# Patient Record
Sex: MALE | Race: WHITE | Employment: FULL TIME | URBAN - METROPOLITAN AREA
[De-identification: names, ages, dates, MRNs, and addresses within clinical notes are randomized per-mention and may not be internally consistent; named-entity substitution may affect disease eponyms.]

---

## 2019-02-22 ENCOUNTER — HOSPITAL ENCOUNTER (EMERGENCY)
Age: 49
Discharge: HOME | End: 2019-02-22
Attending: EMERGENCY MEDICINE
Payer: COMMERCIAL

## 2019-02-22 ENCOUNTER — APPOINTMENT (OUTPATIENT)
Dept: CT IMAGING | Age: 49
End: 2019-02-22
Attending: EMERGENCY MEDICINE
Payer: COMMERCIAL

## 2019-02-22 VITALS
TEMPERATURE: 98.9 F | WEIGHT: 300 LBS | BODY MASS INDEX: 45.47 KG/M2 | RESPIRATION RATE: 19 BRPM | OXYGEN SATURATION: 93 % | HEART RATE: 80 BPM | DIASTOLIC BLOOD PRESSURE: 73 MMHG | HEIGHT: 68 IN | SYSTOLIC BLOOD PRESSURE: 132 MMHG

## 2019-02-22 DIAGNOSIS — J03.90 ACUTE TONSILLITIS, UNSPECIFIED ETIOLOGY: Primary | ICD-10-CM

## 2019-02-22 LAB
ALBUMIN SERPL-MCNC: 3.8 G/DL (ref 3.5–5)
ALBUMIN/GLOB SERPL: 1 {RATIO} (ref 1.1–2.2)
ALP SERPL-CCNC: 65 U/L (ref 45–117)
ALT SERPL-CCNC: 30 U/L (ref 12–78)
ANION GAP SERPL CALC-SCNC: 9 MMOL/L (ref 5–15)
AST SERPL-CCNC: 17 U/L (ref 15–37)
BASOPHILS # BLD: 0.1 K/UL (ref 0–0.1)
BASOPHILS NFR BLD: 0 % (ref 0–1)
BILIRUB SERPL-MCNC: 0.4 MG/DL (ref 0.2–1)
BUN SERPL-MCNC: 11 MG/DL (ref 6–20)
BUN/CREAT SERPL: 11 (ref 12–20)
CALCIUM SERPL-MCNC: 9 MG/DL (ref 8.5–10.1)
CHLORIDE SERPL-SCNC: 102 MMOL/L (ref 97–108)
CO2 SERPL-SCNC: 27 MMOL/L (ref 21–32)
COMMENT, HOLDF: NORMAL
CREAT SERPL-MCNC: 1.04 MG/DL (ref 0.7–1.3)
DEPRECATED S PYO AG THROAT QL EIA: NEGATIVE
DIFFERENTIAL METHOD BLD: ABNORMAL
EOSINOPHIL # BLD: 0.2 K/UL (ref 0–0.4)
EOSINOPHIL NFR BLD: 2 % (ref 0–7)
ERYTHROCYTE [DISTWIDTH] IN BLOOD BY AUTOMATED COUNT: 15.2 % (ref 11.5–14.5)
GLOBULIN SER CALC-MCNC: 4 G/DL (ref 2–4)
GLUCOSE SERPL-MCNC: 109 MG/DL (ref 65–100)
HCT VFR BLD AUTO: 43.6 % (ref 36.6–50.3)
HGB BLD-MCNC: 13.9 G/DL (ref 12.1–17)
IMM GRANULOCYTES # BLD AUTO: 0.1 K/UL (ref 0–0.04)
IMM GRANULOCYTES NFR BLD AUTO: 1 % (ref 0–0.5)
LYMPHOCYTES # BLD: 1.3 K/UL (ref 0.8–3.5)
LYMPHOCYTES NFR BLD: 11 % (ref 12–49)
MCH RBC QN AUTO: 26.8 PG (ref 26–34)
MCHC RBC AUTO-ENTMCNC: 31.9 G/DL (ref 30–36.5)
MCV RBC AUTO: 84 FL (ref 80–99)
MONOCYTES # BLD: 1 K/UL (ref 0–1)
MONOCYTES NFR BLD: 8 % (ref 5–13)
NEUTS SEG # BLD: 9 K/UL (ref 1.8–8)
NEUTS SEG NFR BLD: 78 % (ref 32–75)
NRBC # BLD: 0 K/UL (ref 0–0.01)
NRBC BLD-RTO: 0 PER 100 WBC
PLATELET # BLD AUTO: 563 K/UL (ref 150–400)
PMV BLD AUTO: 11.2 FL (ref 8.9–12.9)
POTASSIUM SERPL-SCNC: 4.1 MMOL/L (ref 3.5–5.1)
PROT SERPL-MCNC: 7.8 G/DL (ref 6.4–8.2)
RBC # BLD AUTO: 5.19 M/UL (ref 4.1–5.7)
SAMPLES BEING HELD,HOLD: NORMAL
SODIUM SERPL-SCNC: 138 MMOL/L (ref 136–145)
WBC # BLD AUTO: 11.7 K/UL (ref 4.1–11.1)

## 2019-02-22 PROCEDURE — 87880 STREP A ASSAY W/OPTIC: CPT

## 2019-02-22 PROCEDURE — 80053 COMPREHEN METABOLIC PANEL: CPT

## 2019-02-22 PROCEDURE — 74011636320 HC RX REV CODE- 636/320: Performed by: RADIOLOGY

## 2019-02-22 PROCEDURE — 74011250636 HC RX REV CODE- 250/636: Performed by: EMERGENCY MEDICINE

## 2019-02-22 PROCEDURE — 87070 CULTURE OTHR SPECIMN AEROBIC: CPT

## 2019-02-22 PROCEDURE — 70491 CT SOFT TISSUE NECK W/DYE: CPT

## 2019-02-22 PROCEDURE — 96375 TX/PRO/DX INJ NEW DRUG ADDON: CPT

## 2019-02-22 PROCEDURE — 99284 EMERGENCY DEPT VISIT MOD MDM: CPT

## 2019-02-22 PROCEDURE — 96376 TX/PRO/DX INJ SAME DRUG ADON: CPT

## 2019-02-22 PROCEDURE — 96374 THER/PROPH/DIAG INJ IV PUSH: CPT

## 2019-02-22 PROCEDURE — 36415 COLL VENOUS BLD VENIPUNCTURE: CPT

## 2019-02-22 PROCEDURE — 85025 COMPLETE CBC W/AUTO DIFF WBC: CPT

## 2019-02-22 PROCEDURE — 96361 HYDRATE IV INFUSION ADD-ON: CPT

## 2019-02-22 RX ORDER — NALOXONE HYDROCHLORIDE 4 MG/.1ML
SPRAY NASAL
Qty: 1 EACH | Refills: 0 | Status: SHIPPED | OUTPATIENT
Start: 2019-02-22 | End: 2022-01-04

## 2019-02-22 RX ORDER — HYDROMORPHONE HYDROCHLORIDE 2 MG/ML
1 INJECTION, SOLUTION INTRAMUSCULAR; INTRAVENOUS; SUBCUTANEOUS
Status: COMPLETED | OUTPATIENT
Start: 2019-02-22 | End: 2019-02-22

## 2019-02-22 RX ORDER — DEXAMETHASONE SODIUM PHOSPHATE 10 MG/ML
10 INJECTION INTRAMUSCULAR; INTRAVENOUS
Status: COMPLETED | OUTPATIENT
Start: 2019-02-22 | End: 2019-02-22

## 2019-02-22 RX ORDER — CLINDAMYCIN HYDROCHLORIDE 300 MG/1
300 CAPSULE ORAL 4 TIMES DAILY
Qty: 28 CAP | Refills: 0 | Status: SHIPPED | OUTPATIENT
Start: 2019-02-22 | End: 2022-01-04

## 2019-02-22 RX ORDER — LIDOCAINE HYDROCHLORIDE AND EPINEPHRINE 10; 10 MG/ML; UG/ML
1.5 INJECTION, SOLUTION INFILTRATION; PERINEURAL
Status: DISCONTINUED | OUTPATIENT
Start: 2019-02-22 | End: 2019-02-22 | Stop reason: HOSPADM

## 2019-02-22 RX ORDER — HYDROCODONE BITARTRATE AND ACETAMINOPHEN 5; 325 MG/1; MG/1
1 TABLET ORAL
Qty: 12 TAB | Refills: 0 | Status: SHIPPED | OUTPATIENT
Start: 2019-02-22 | End: 2022-01-04

## 2019-02-22 RX ORDER — CLINDAMYCIN PHOSPHATE 300 MG/50ML
300 INJECTION, SOLUTION INTRAVENOUS
Status: COMPLETED | OUTPATIENT
Start: 2019-02-22 | End: 2019-02-22

## 2019-02-22 RX ORDER — PREDNISONE 20 MG/1
TABLET ORAL
Qty: 13 TAB | Refills: 0 | Status: SHIPPED | OUTPATIENT
Start: 2019-02-22 | End: 2022-01-04

## 2019-02-22 RX ORDER — ONDANSETRON 2 MG/ML
4 INJECTION INTRAMUSCULAR; INTRAVENOUS
Status: COMPLETED | OUTPATIENT
Start: 2019-02-22 | End: 2019-02-22

## 2019-02-22 RX ADMIN — CLINDAMYCIN PHOSPHATE 300 MG: 300 INJECTION, SOLUTION INTRAVENOUS at 08:02

## 2019-02-22 RX ADMIN — IOPAMIDOL 100 ML: 612 INJECTION, SOLUTION INTRAVENOUS at 05:42

## 2019-02-22 RX ADMIN — HYDROMORPHONE HYDROCHLORIDE 1 MG: 2 INJECTION INTRAMUSCULAR; INTRAVENOUS; SUBCUTANEOUS at 05:07

## 2019-02-22 RX ADMIN — ONDANSETRON 4 MG: 2 INJECTION INTRAMUSCULAR; INTRAVENOUS at 05:06

## 2019-02-22 RX ADMIN — DEXAMETHASONE SODIUM PHOSPHATE 10 MG: 10 INJECTION, SOLUTION INTRAMUSCULAR; INTRAVENOUS at 05:08

## 2019-02-22 RX ADMIN — HYDROMORPHONE HYDROCHLORIDE 1 MG: 2 INJECTION INTRAMUSCULAR; INTRAVENOUS; SUBCUTANEOUS at 08:36

## 2019-02-22 RX ADMIN — SODIUM CHLORIDE 1000 ML: 900 INJECTION, SOLUTION INTRAVENOUS at 08:40

## 2019-02-22 NOTE — ED PROVIDER NOTES
The patient presents to the ED with sore throat. Symptoms began on Thursday PM after he felt he got a piece of almond stuck in his throat. He saw his PCP for symptoms Wednesday and had negative rapid strep test. He was was prescribed Azithromycin for \"tonsilitis. \" He notes increasing pain last night. He took some leftover Hydrocodone with no relief. He reports severe 10/10 pain in the back of his throat. He tried to use a water pick and states \"pus came out. \" He uses a CPAP and could not sleep and had to \"turn it all the way up\" to breath. He is having trouble swallowing his own secretions. His wife drove him to the ED. He has no ENT. He denies any other complaints. Past Medical History:  
Diagnosis Date  Arrhythmia   
 a fib,1 st episode 20 years ago; diminshed over years; last episode 6 months ago and converts very quickly. Converts usually within 4 hours.  Asthma   
 dx: 15 yrs ago ; never has had an asthma attack. Doesn't use inhalers. Never has any symptoms.  GERD (gastroesophageal reflux disease)  Ill-defined condition 4/21/14  
 iron infusion  Ill-defined condition   
 seasonal allergies  Other ill-defined conditions(799.89) migraines  Primary thrombocytosis ([facility] [address]  Unspecified sleep apnea   
 compliant w/ c-pap at times Past Surgical History:  
Procedure Laterality Date  HX COLONOSCOPY    
 HX HEENT  2007 Lasik  eye surgery betty  HX HEENT    
 facial repair  HX ORTHOPAEDIC  2010  
 repair of tibia/fibula w/ screws and pins right Family History:  
Problem Relation Age of Onset  Lupus Mother  Diabetes Father Social History Socioeconomic History  Marital status:  Spouse name: Not on file  Number of children: Not on file  Years of education: Not on file  Highest education level: Not on file Social Needs  Financial resource strain: Not on file  Food insecurity - worry: Not on file [address] insecurity - inability: Not on file  Transportation needs - medical: Not on file  Transportation needs - non-medical: Not on file Occupational History  Not on file Tobacco Use  Smoking status: Never Smoker  Smokeless tobacco: Never Used Substance and Sexual Activity  Alcohol use: No  
 Drug use: No  
 Sexual activity: Not on file Other Topics Concern  Not on file Social History Narrative  Not on file ALLERGIES: Amoxicillin; Anagrelide; Cefprozil; and Penicillins Review of Systems Constitutional: Negative for appetite change and fever. HENT: Positive for sore throat, trouble swallowing and voice change. Negative for congestion, facial swelling, mouth sores, nosebleeds, postnasal drip and rhinorrhea. Eyes: Negative for discharge and visual disturbance. Respiratory: Negative for cough and shortness of breath. Cardiovascular: Negative for chest pain. Gastrointestinal: Negative for abdominal pain, diarrhea, nausea and vomiting. Genitourinary: Negative for dysuria. Musculoskeletal: Negative. Skin: Negative for rash. Neurological: Negative for weakness and headaches. Hematological: Negative for adenopathy. Psychiatric/Behavioral: Negative. All other systems reviewed and are negative. Vitals:  
 02/22/19 0434 02/22/19 0549 02/22/19 0600 02/22/19 0745 BP: (!) 164/95 138/75 136/70 132/73 Pulse: 80 Resp: 19 Temp: 98.9 °F (37.2 °C) SpO2: 95% 92% 92% 94% Weight: 136.1 kg (300 lb) Height: 5' 8\" (1.727 m) Physical Exam  
Constitutional: He is oriented to person, place, and time. He appears well-developed and well-nourished. He appears distressed (appears uncomfortable. ). HENT:  
Head: Normocephalic and atraumatic. Mouth/Throat: Uvula swelling (edema noted to uvula) present. Posterior oropharyngeal edema (edema noted to the R tonsil) present. Eyes: Conjunctivae are normal.  
Neck: Normal range of motion. Neck supple. Cardiovascular: Normal rate, regular rhythm and normal heart sounds. Pulmonary/Chest: Effort normal and breath sounds normal.  
Abdominal: Soft. Bowel sounds are normal.  
Musculoskeletal: Normal range of motion. Neurological: He is alert and oriented to person, place, and time. Skin: Skin is warm and dry. Psychiatric: He has a normal mood and affect. Nursing note and vitals reviewed. MDM Procedures 7:30 AM 
CONSULT: 
Dr. Anjelica Lechuga - standard discussion. He has a surgery at [facility] and then will be over to see the patient. A/P: 
1. Tonsillar abscess - Clindamycin and Decadron given. ENT to see. 8:27 AM 
Change of shift. Care of patient signed over to Dr. Adele Begum  Bedside handoff complete.

## 2019-02-22 NOTE — CONSULTS
Otolaryngology - Head & Neck Surgery Consult (ER)        PATIENT NAME: Jim Ramírez  MRN: [id number]  DATE: 2/22/2019  ADMISSION DATE: 2/22/2019      Subjective:     Mr. Elizabeth Barahona is 51years old and presented to ER this AM with right-sided throat pain and dysphagia. He's started with a \"sore throat\" 3 days ago. Was seen by Dr. Jim Schmitz and given Zithromax and Naproxen. He's had progressive right-sided throat pain over the last 72 hours. Uses CPAP nightly. Mild dyspnea noted. Strep (rapid) negative. CT soft tissue neck showed 2 cm fluid collection within the right palatine tonsil. Objective:     Visit Vitals  /73   Pulse 80   Temp 98.9 °F (37.2 °C)   Resp 19   Ht 5' 8\" (1.727 m)   Wt 136.1 kg (300 lb)   SpO2 94%   BMI 45.61 kg/m²     No intake/output data recorded. Physical Exam:     General - sitting up in bed, NAD. Normal voice (no hot potato voice). Handling secretions. Head & face - no lesions or edema. Nose - clear anteriorly. Oral/oropharynx - mild/moderate \"water bag\" edema of uvula. Mild right tonsillar enlargement but no exudate. No peritonsillar/palate fullness, swelling or induration. Palpation of the tonsil on right -- no mass or appreciable abscess/induration. Tongue normal. Oropharyngeal airway patent. Neck - soft, non-tender. No appreciable adenopathy. CT soft tissue neck -- fluid collection within right tonsil. No peritonsillar or parapharyngeal space abscess. Reactive right adenopathy, 2 cm. Assessment:     Acute tonsillitis with likely intratonsillar fluid collection. No clear evidence of peritonsillar or parapharyngeal/retropharyngeal abscess. I cannot appreciate on exam any drainable abscess. His airway is patent but has some mild to moderate uvular edema. The edema does not extend laterally. Plan:     Management options reviewed with patient and wife. Discussed with ER staff as well. 1. Clindamycin po.  2. Prednisone taper.   3. Continue with CPAP use.  4. Call office in 72 hours if no improvement. Return to ER if symptoms progress.        Racquel Sow MD   [phone] - Office   [phone] - Cell

## 2019-02-22 NOTE — DISCHARGE INSTRUCTIONS
Patient Education            We hope that we have addressed all of your medical concerns. The examination and treatment you received in the Emergency Department were for an emergent problem and were not intended as complete care. It is important that you follow up with your healthcare provider(s) for ongoing care. If your symptoms worsen or do not improve as expected, and you are unable to reach your usual health care provider(s), you should return to the Emergency Department. Today's healthcare is undergoing tremendous change, and patient satisfaction surveys are one of the many tools to assess the quality of medical care. You may receive a survey from the [employer] regarding your experience in the Emergency Department. I hope that your experience has been completely positive, particularly the medical care that I provided. As such, please participate in the survey; anything less than excellent does not meet my expectations or intentions. [facility] and 8 [facility] participate in nationally recognized quality of care measures. If your blood pressure is greater than 120/80, as reported below, we urge that you seek medical care to address the potential of high blood pressure, commonly known as hypertension. Hypertension can be hereditary or can be caused by certain medical conditions, pain, stress, or \"white coat syndrome. \"       Please make an appointment with your health care provider(s) for follow up of your Emergency Department visit. VITALS:   Patient Vitals for the past 8 hrs:   Temp Pulse Resp BP SpO2   02/22/19 0745 -- -- -- 132/73 94 %   02/22/19 0600 -- -- -- 136/70 92 %   02/22/19 0549 -- -- -- 138/75 92 %   02/22/19 0434 98.9 °F (37.2 °C) 80 19 (!) 164/95 95 %          Thank you for allowing us to provide you with medical care today. We realize that you have many choices for your emergency care needs.   Please choose us in the future for any continued health care needs. Rachael Pearson MD    [facility], [facility].   Office: [phone]            Recent Results (from the past 24 hour(s))   STREP AG SCREEN, GROUP A    Collection Time: 02/22/19  4:59 AM   Result Value Ref Range    Group A Strep Ag ID NEGATIVE  NEG     CBC WITH AUTOMATED DIFF    Collection Time: 02/22/19  4:59 AM   Result Value Ref Range    WBC 11.7 (H) 4.1 - 11.1 K/uL    RBC 5.19 4. 10 - 5.70 M/uL    HGB 13.9 12.1 - 17.0 g/dL    HCT 43.6 36.6 - 50.3 %    MCV 84.0 80.0 - 99.0 FL    MCH 26.8 26.0 - 34.0 PG    MCHC 31.9 30.0 - 36.5 g/dL    RDW 15.2 (H) 11.5 - 14.5 %    PLATELET 286 (H) 913 - 400 K/uL    MPV 11.2 8.9 - 12.9 FL    NRBC 0.0 0  WBC    ABSOLUTE NRBC 0.00 0.00 - 0.01 K/uL    NEUTROPHILS 78 (H) 32 - 75 %    LYMPHOCYTES 11 (L) 12 - 49 %    MONOCYTES 8 5 - 13 %    EOSINOPHILS 2 0 - 7 %    BASOPHILS 0 0 - 1 %    IMMATURE GRANULOCYTES 1 (H) 0.0 - 0.5 %    ABS. NEUTROPHILS 9.0 (H) 1.8 - 8.0 K/UL    ABS. LYMPHOCYTES 1.3 0.8 - 3.5 K/UL    ABS. MONOCYTES 1.0 0.0 - 1.0 K/UL    ABS. EOSINOPHILS 0.2 0.0 - 0.4 K/UL    ABS. BASOPHILS 0.1 0.0 - 0.1 K/UL    ABS. IMM. GRANS. 0.1 (H) 0.00 - 0.04 K/UL    DF AUTOMATED     METABOLIC PANEL, COMPREHENSIVE    Collection Time: 02/22/19  4:59 AM   Result Value Ref Range    Sodium 138 136 - 145 mmol/L    Potassium 4.1 3.5 - 5.1 mmol/L    Chloride 102 97 - 108 mmol/L    CO2 27 21 - 32 mmol/L    Anion gap 9 5 - 15 mmol/L    Glucose 109 (H) 65 - 100 mg/dL    BUN 11 6 - 20 MG/DL    Creatinine 1.04 0.70 - 1.30 MG/DL    BUN/Creatinine ratio 11 (L) 12 - 20      GFR est AA >60 >60 ml/min/1.73m2    GFR est non-AA >60 >60 ml/min/1.73m2    Calcium 9.0 8.5 - 10.1 MG/DL    Bilirubin, total 0.4 0.2 - 1.0 MG/DL    ALT (SGPT) 30 12 - 78 U/L    AST (SGOT) 17 15 - 37 U/L    Alk.  phosphatase 65 45 - 117 U/L    Protein, total 7.8 6.4 - 8.2 g/dL    Albumin 3.8 3.5 - 5.0 g/dL    Globulin 4.0 2.0 - 4.0 g/dL    A-G Ratio 1.0 (L) 1.1 - 2.2     SAMPLES BEING HELD    Collection Time: 02/22/19  4:59 AM   Result Value Ref Range    SAMPLES BEING HELD 1RED,1BLU,1SST,1DRKGRN     COMMENT        Add-on orders for these samples will be processed based on acceptable specimen integrity and analyte stability, which may vary by analyte. Ct Neck Soft Tissue W Cont    Result Date: 2/22/2019  EXAM: CT NECK SOFT TISSUE W CONT INDICATION: Right throat pain and difficulty swallowing. COMPARISON:  None TECHNIQUE:  Helical neck CT following the uneventful administration of 100 mL Isovue 300. Coronal and sagittal reformats. CT dose reduction was achieved through the use of a standardized protocol tailored for this examination and automatic exposure control for dose modulation. FINDINGS: The visualized paranasal sinuses are clear. Peripherally enhancing fluid collection in the right palatine tonsils measures approximately 2.3 x 1.1 x 2.1 cm. Right palatine tonsils are enlarged. Airway is patent but mildly narrowed. Hypopharynx and larynx are within normal limits. There is limitation in evaluating the oral cavity, although grossly the oral tongue, buccal spaces and floor of mouth are normal. The thyroid, submandibular, and parotid glands are normal.  Largest right cervical lymph node is in level 2 and measures 2.0 x 1.0 cm. There are scattered nonenlarged left cervical lymph nodes. Lung apices are clear. IMPRESSION: 1. 2.3 x 1.1 x 2.1 cm abscess is centered in the enlarged right palatine tonsils. Mild mass effect on the otherwise patent airway. 2. Reactive right cervical lymphadenopathy. Tonsillitis: Care Instructions  Your Care Instructions    Tonsillitis is an infection of the tonsils that is caused by bacteria or a virus. The tonsils are in the back of the throat and are part of the immune system. Tonsillitis typically lasts from a few days up to a couple of weeks. Tonsillitis caused by a virus goes away on its own.  Tonsillitis caused by the bacteria that causes strep throat is treated with antibiotics. You and your doctor may consider surgery to remove the tonsils (tonsillectomy) if you have serious complications or repeat infections. Follow-up care is a key part of your treatment and safety. Be sure to make and go to all appointments, and call your doctor if you are having problems. It's also a good idea to know your test results and keep a list of the medicines you take. How can you care for yourself at home? · If your doctor prescribed antibiotics, take them as directed. Do not stop taking them just because you feel better. You need to take the full course of antibiotics. · Gargle with warm salt water. This helps reduce swelling and relieve discomfort. Gargle once an hour with 1 teaspoon of salt mixed in 8 fluid ounces of warm water. · Take an over-the-counter pain medicine, such as acetaminophen (Tylenol), ibuprofen (Advil, Motrin), or naproxen (Aleve). Be safe with medicines. Read and follow all instructions on the label. No one younger than 20 should take aspirin. It has been linked to Reye syndrome, a serious illness. · Be careful when taking over-the-counter cold or flu medicines and Tylenol at the same time. Many of these medicines have acetaminophen, which is Tylenol. Read the labels to make sure that you are not taking more than the recommended dose. Too much acetaminophen (Tylenol) can be harmful. · Try an over-the-counter throat spray to relieve throat pain. · Drink plenty of fluids. Fluids may help soothe an irritated throat. Drink warm or cool liquids (whichever feels better). These include tea, soup, and juice. · Do not smoke, and avoid secondhand smoke. Smoking can make tonsillitis worse. If you need help quitting, talk to your doctor about stop-smoking programs and medicines. These can increase your chances of quitting for good. · Use a vaporizer or humidifier to add moisture to your bedroom.  Follow the directions for cleaning the machine. When should you call for help? Call your doctor now or seek immediate medical care if:    · Your pain gets worse on one side of your throat.     · You have a new or higher fever.     · You notice changes in your voice.     · You have trouble opening your mouth.     · You have any trouble breathing.     · You have much more trouble swallowing.     · You have a fever with a stiff neck or a severe headache.     · You are sensitive to light or feel very sleepy or confused.    Watch closely for changes in your health, and be sure to contact your doctor if:    · You do not get better after 2 days. Where can you learn more? Go to http://tanika-sharon.info/. Enter L715 in the search box to learn more about \"Tonsillitis: Care Instructions. \"  Current as of: March 27, 2018  Content Version: 11.9  © 8331-2106 [employer]. Care instructions adapted under license by [employer] (which disclaims liability or warranty for this information). If you have questions about a medical condition or this instruction, always ask your healthcare professional. [address] any warranty or liability for your use of this information.

## 2019-02-22 NOTE — ED NOTES
Pt seen by ent in ED. No need for drainage. More of a phlegmon. recommends dc home with prednisone x 5 days, abx and pain medication

## 2019-02-22 NOTE — ED TRIAGE NOTES
Pt. States started with pain on right side of mouth/tonsil on Tuesday, states was given abx on Wednesday, states has been gargling salt water, states tonight swelling is worse with dental pain noted to right lower teeth, states also having difficulty breathing when laying down in the bed even with CPAP turned all the way up.

## 2019-02-24 LAB
BACTERIA SPEC CULT: NORMAL
SERVICE CMNT-IMP: NORMAL

## 2022-01-04 ENCOUNTER — HOSPITAL ENCOUNTER (EMERGENCY)
Age: 52
Discharge: HOME | End: 2022-01-04
Attending: EMERGENCY MEDICINE
Payer: COMMERCIAL

## 2022-01-04 VITALS
TEMPERATURE: 98.5 F | DIASTOLIC BLOOD PRESSURE: 97 MMHG | HEIGHT: 68 IN | WEIGHT: 290 LBS | RESPIRATION RATE: 16 BRPM | SYSTOLIC BLOOD PRESSURE: 141 MMHG | BODY MASS INDEX: 43.95 KG/M2 | OXYGEN SATURATION: 98 % | HEART RATE: 84 BPM

## 2022-01-04 DIAGNOSIS — B02.9 HERPES ZOSTER WITHOUT COMPLICATION: ICD-10-CM

## 2022-01-04 DIAGNOSIS — R51.9 ACUTE NONINTRACTABLE HEADACHE, UNSPECIFIED HEADACHE TYPE: Primary | ICD-10-CM

## 2022-01-04 PROCEDURE — 74011250637 HC RX REV CODE- 250/637: Performed by: EMERGENCY MEDICINE

## 2022-01-04 PROCEDURE — 74011636637 HC RX REV CODE- 636/637: Performed by: EMERGENCY MEDICINE

## 2022-01-04 PROCEDURE — 99283 EMERGENCY DEPT VISIT LOW MDM: CPT

## 2022-01-04 RX ORDER — PREDNISONE 5 MG/1
TABLET ORAL
Qty: 1 DOSE PACK | Refills: 0 | Status: SHIPPED | OUTPATIENT
Start: 2022-01-04

## 2022-01-04 RX ORDER — HYDROCODONE BITARTRATE AND ACETAMINOPHEN 7.5; 325 MG/1; MG/1
1 TABLET ORAL
Qty: 12 TABLET | Refills: 0 | Status: SHIPPED | OUTPATIENT
Start: 2022-01-04 | End: 2022-01-07

## 2022-01-04 RX ORDER — ACYCLOVIR 800 MG/1
800 TABLET ORAL
Qty: 35 TABLET | Refills: 0 | Status: SHIPPED | OUTPATIENT
Start: 2022-01-04 | End: 2022-01-11

## 2022-01-04 RX ORDER — PROCHLORPERAZINE EDISYLATE 5 MG/ML
10 INJECTION INTRAMUSCULAR; INTRAVENOUS
Status: DISCONTINUED | OUTPATIENT
Start: 2022-01-04 | End: 2022-01-04

## 2022-01-04 RX ORDER — ACYCLOVIR 800 MG/1
800 TABLET ORAL
Status: DISCONTINUED | OUTPATIENT
Start: 2022-01-04 | End: 2022-01-04 | Stop reason: HOSPADM

## 2022-01-04 RX ORDER — BUTALBITAL, ACETAMINOPHEN AND CAFFEINE 50; 325; 40 MG/1; MG/1; MG/1
2 TABLET ORAL
Status: COMPLETED | OUTPATIENT
Start: 2022-01-04 | End: 2022-01-04

## 2022-01-04 RX ORDER — MAGNESIUM SULFATE HEPTAHYDRATE 40 MG/ML
2 INJECTION, SOLUTION INTRAVENOUS
Status: DISCONTINUED | OUTPATIENT
Start: 2022-01-04 | End: 2022-01-04 | Stop reason: HOSPADM

## 2022-01-04 RX ORDER — PREDNISONE 20 MG/1
60 TABLET ORAL
Status: COMPLETED | OUTPATIENT
Start: 2022-01-04 | End: 2022-01-04

## 2022-01-04 RX ORDER — DIPHENHYDRAMINE HYDROCHLORIDE 50 MG/ML
25 INJECTION, SOLUTION INTRAMUSCULAR; INTRAVENOUS
Status: DISCONTINUED | OUTPATIENT
Start: 2022-01-04 | End: 2022-01-04

## 2022-01-04 RX ORDER — PREDNISONE 20 MG/1
60 TABLET ORAL
Status: DISCONTINUED | OUTPATIENT
Start: 2022-01-04 | End: 2022-01-04

## 2022-01-04 RX ADMIN — PREDNISONE 60 MG: 20 TABLET ORAL at 19:43

## 2022-01-04 RX ADMIN — BUTALBITAL, ACETAMINOPHEN, AND CAFFEINE 2 TABLET: 50; 325; 40 TABLET ORAL at 19:43

## 2022-01-04 NOTE — Clinical Note
[address] [facility] EMERGENCY DEPARTMENT  [address] [facility] [address]  [phone]    Work/School Note    Date: 1/4/2022    To Whom It May concern:    Diomedes Mcgowan was seen and treated today in the emergency room by the following provider(s):  Attending Provider: Miky Santana MD.      Diomedes Mcgowan is excused from work/school on 1/4/2022 through 1/6/2022. He is medically clear to return to work/school on 1/7/2022.          Sincerely,          Ag Urrutia MD

## 2023-01-29 ENCOUNTER — APPOINTMENT (OUTPATIENT)
Dept: GENERAL RADIOLOGY | Age: 53
End: 2023-01-29
Attending: EMERGENCY MEDICINE
Payer: COMMERCIAL

## 2023-01-29 ENCOUNTER — HOSPITAL ENCOUNTER (EMERGENCY)
Age: 53
Discharge: HOME | End: 2023-01-29
Attending: EMERGENCY MEDICINE
Payer: COMMERCIAL

## 2023-01-29 VITALS
TEMPERATURE: 97.6 F | RESPIRATION RATE: 20 BRPM | BODY MASS INDEX: 44.71 KG/M2 | DIASTOLIC BLOOD PRESSURE: 89 MMHG | SYSTOLIC BLOOD PRESSURE: 131 MMHG | HEART RATE: 80 BPM | OXYGEN SATURATION: 97 % | WEIGHT: 295 LBS | HEIGHT: 68 IN

## 2023-01-29 DIAGNOSIS — I48.91 ATRIAL FIBRILLATION, UNSPECIFIED TYPE (HCC): Primary | ICD-10-CM

## 2023-01-29 LAB
ALBUMIN SERPL-MCNC: 3.9 G/DL (ref 3.5–5)
ALBUMIN/GLOB SERPL: 1.2 (ref 1.1–2.2)
ALP SERPL-CCNC: 63 U/L (ref 45–117)
ALT SERPL-CCNC: 53 U/L (ref 12–78)
ANION GAP SERPL CALC-SCNC: 5 MMOL/L (ref 5–15)
AST SERPL W P-5'-P-CCNC: 26 U/L (ref 15–37)
BASOPHILS # BLD: 0.1 K/UL (ref 0–0.1)
BASOPHILS NFR BLD: 1 % (ref 0–1)
BILIRUB SERPL-MCNC: 0.3 MG/DL (ref 0.2–1)
BNP SERPL-MCNC: 272 PG/ML
BUN SERPL-MCNC: 13 MG/DL (ref 6–20)
BUN/CREAT SERPL: 12 (ref 12–20)
CA-I BLD-MCNC: 8.9 MG/DL (ref 8.5–10.1)
CHLORIDE SERPL-SCNC: 110 MMOL/L (ref 97–108)
CO2 SERPL-SCNC: 24 MMOL/L (ref 21–32)
CREAT SERPL-MCNC: 1.06 MG/DL (ref 0.7–1.3)
DIFFERENTIAL METHOD BLD: ABNORMAL
EOSINOPHIL # BLD: 0.3 K/UL (ref 0–0.4)
EOSINOPHIL NFR BLD: 3 % (ref 0–7)
ERYTHROCYTE [DISTWIDTH] IN BLOOD BY AUTOMATED COUNT: 15.9 % (ref 11.5–14.5)
GLOBULIN SER CALC-MCNC: 3.2 G/DL (ref 2–4)
GLUCOSE SERPL-MCNC: 131 MG/DL (ref 65–100)
HCT VFR BLD AUTO: 45.2 % (ref 36.6–50.3)
HGB BLD-MCNC: 14.8 G/DL (ref 12.1–17)
IMM GRANULOCYTES # BLD AUTO: 0.1 K/UL (ref 0–0.04)
IMM GRANULOCYTES NFR BLD AUTO: 1 % (ref 0–0.5)
LYMPHOCYTES # BLD: 1.8 K/UL (ref 0.8–3.5)
LYMPHOCYTES NFR BLD: 18 % (ref 12–49)
MAGNESIUM SERPL-MCNC: 2.3 MG/DL (ref 1.6–2.4)
MCH RBC QN AUTO: 27.3 PG (ref 26–34)
MCHC RBC AUTO-ENTMCNC: 32.7 G/DL (ref 30–36.5)
MCV RBC AUTO: 83.4 FL (ref 80–99)
MONOCYTES # BLD: 0.7 K/UL (ref 0–1)
MONOCYTES NFR BLD: 7 % (ref 5–13)
NEUTS SEG # BLD: 6.8 K/UL (ref 1.8–8)
NEUTS SEG NFR BLD: 70 % (ref 32–75)
NRBC # BLD: 0.04 K/UL (ref 0–0.01)
NRBC BLD-RTO: 0.4 PER 100 WBC
PLATELET # BLD AUTO: 568 K/UL (ref 150–400)
PMV BLD AUTO: 10.9 FL (ref 8.9–12.9)
POTASSIUM SERPL-SCNC: 4 MMOL/L (ref 3.5–5.1)
PROT SERPL-MCNC: 7.1 G/DL (ref 6.4–8.2)
RBC # BLD AUTO: 5.42 M/UL (ref 4.1–5.7)
SODIUM SERPL-SCNC: 139 MMOL/L (ref 136–145)
TROPONIN-HIGH SENSITIVITY: 10 NG/L (ref 0–76)
TROPONIN-HIGH SENSITIVITY: 9 NG/L (ref 0–76)
WBC # BLD AUTO: 9.7 K/UL (ref 4.1–11.1)

## 2023-01-29 PROCEDURE — 80053 COMPREHEN METABOLIC PANEL: CPT

## 2023-01-29 PROCEDURE — 96374 THER/PROPH/DIAG INJ IV PUSH: CPT

## 2023-01-29 PROCEDURE — 36415 COLL VENOUS BLD VENIPUNCTURE: CPT

## 2023-01-29 PROCEDURE — 83880 ASSAY OF NATRIURETIC PEPTIDE: CPT

## 2023-01-29 PROCEDURE — 71045 X-RAY EXAM CHEST 1 VIEW: CPT

## 2023-01-29 PROCEDURE — 74011250636 HC RX REV CODE- 250/636: Performed by: EMERGENCY MEDICINE

## 2023-01-29 PROCEDURE — 84484 ASSAY OF TROPONIN QUANT: CPT

## 2023-01-29 PROCEDURE — 74011000250 HC RX REV CODE- 250: Performed by: EMERGENCY MEDICINE

## 2023-01-29 PROCEDURE — 83735 ASSAY OF MAGNESIUM: CPT

## 2023-01-29 PROCEDURE — 99285 EMERGENCY DEPT VISIT HI MDM: CPT

## 2023-01-29 PROCEDURE — 93005 ELECTROCARDIOGRAM TRACING: CPT

## 2023-01-29 PROCEDURE — 85025 COMPLETE CBC W/AUTO DIFF WBC: CPT

## 2023-01-29 RX ORDER — DILTIAZEM HYDROCHLORIDE 5 MG/ML
15 INJECTION INTRAVENOUS
Status: COMPLETED | OUTPATIENT
Start: 2023-01-29 | End: 2023-01-29

## 2023-01-29 RX ORDER — DILTIAZEM HYDROCHLORIDE 60 MG/1
60 CAPSULE, EXTENDED RELEASE ORAL
Status: DISCONTINUED | OUTPATIENT
Start: 2023-01-29 | End: 2023-01-29 | Stop reason: HOSPADM

## 2023-01-29 RX ORDER — DILTIAZEM HYDROCHLORIDE 120 MG/1
120 CAPSULE, COATED, EXTENDED RELEASE ORAL DAILY
Qty: 21 CAPSULE | Refills: 0 | Status: SHIPPED | OUTPATIENT
Start: 2023-01-29 | End: 2023-02-19

## 2023-01-29 RX ADMIN — DILTIAZEM HYDROCHLORIDE 15 MG: 5 INJECTION INTRAVENOUS at 13:42

## 2023-01-29 RX ADMIN — SODIUM CHLORIDE 1000 ML: 9 INJECTION, SOLUTION INTRAVENOUS at 13:46

## 2023-01-29 NOTE — ED TRIAGE NOTES
Pt presents with SOB, states he occasionally goes into Afib and that's when he feels like this. States he's been drinking plenty of water and that usually triggers it but yesterday he thinks he didn't drink enough. Took Xarelto this morning, states his cardiologist wants him to take it only when he feels like he's like he's in Afib.

## 2023-01-29 NOTE — ED PROVIDER NOTES
EMERGENCY DEPARTMENT HISTORY AND PHYSICAL EXAM      Date: 1/29/2023  Patient Name: Jesica Gunderson      History of Presenting Illness     Chief Complaint   Patient presents with    Palpitations       History Provided By: Patient    Location/Duration/Severity/Modifying factors   44-year-old male who presents to the emergency department with palpitations, shortness of breath and left shoulder pain. He states that this feels like he is in A. fib. He had this happened to him many times in the past although not recently, most recently that it occurred was back in 2018. He has been cardioverted twice in the emergency room. He is followed by a cardiologist that he identifies as a doctor Juan Diego Crandall. He has not been in A. fib since reportedly 2018. He denies any real chest pain but feels palpitations, generalized weakness and left shoulder pain. He took his Xarelto this morning. He states that he always knows exactly when he goes into atrial fibrillation. He states that last night he was feeling fine except for mild headache and this morning he developed the typical A. fib symptoms. He has been cardioverted twice in the ED not emergently. He denies any vomiting or diarrhea or abdominal pain. States that it typically happens when he does not drink enough water as was the case yesterday. There are no other complaints, changes, or physical findings at this time. PCP: Marleen Castro MD    Current Outpatient Medications   Medication Sig Dispense Refill    rivaroxaban (XARELTO) 20 mg tab tablet Take 1 Tablet by mouth daily for 21 days. 21 Tablet 0    dilTIAZem ER (Cardizem CD) 120 mg capsule Take 1 Capsule by mouth daily for 21 days.  21 Capsule 0    predniSONE (STERAPRED) 5 mg dose pack Take as directed 1 Dose Pack 0       Past History     Past Medical History:  Past Medical History:   Diagnosis Date    Arrhythmia     a fib,1 st episode 20 years ago; diminshed over years; last episode 6 months ago and converts very quickly. Converts usually within 4 hours. Asthma     dx: 15 yrs ago ; never has had an asthma attack. Doesn't use inhalers. Never has any symptoms. GERD (gastroesophageal reflux disease)     Ill-defined condition 4/21/14    iron infusion      Ill-defined condition     seasonal allergies    Other ill-defined conditions(799.89)     migraines    Primary thrombocytosis (HCC)     Unspecified sleep apnea     compliant w/ c-pap at times       Past Surgical History:  Past Surgical History:   Procedure Laterality Date    HX COLONOSCOPY      HX HEENT  2007    Lasik  eye surgery betty    HX HEENT      facial repair    HX ORTHOPAEDIC  2010    repair of tibia/fibula w/ screws and pins right        Family History:  Family History   Problem Relation Age of Onset    Lupus Mother     Diabetes Father        Social History:  Social History     Tobacco Use    Smoking status: Never    Smokeless tobacco: Never   Substance Use Topics    Alcohol use: No    Drug use: No       Allergies: Allergies   Allergen Reactions    Amoxicillin Hives    Anagrelide Other (comments)     Severe headaches and dizzines    Cefprozil Rash    Penicillins Hives       Medications:  Current Outpatient Medications   Medication Sig Dispense Refill    rivaroxaban (XARELTO) 20 mg tab tablet Take 1 Tablet by mouth daily for 21 days. 21 Tablet 0    dilTIAZem ER (Cardizem CD) 120 mg capsule Take 1 Capsule by mouth daily for 21 days.  21 Capsule 0    predniSONE (STERAPRED) 5 mg dose pack Take as directed 1 Dose Pack 0       Social Determinants of Health:  Social Determinants of Health     Tobacco Use: Not on file   Alcohol Use: Not on file   Financial Resource Strain: Not on file   Food Insecurity: Not on file   Transportation Needs: Not on file   Physical Activity: Not on file   Stress: Not on file   Social Connections: Not on file   Intimate Partner Violence: Not on file   Depression: Not on file   Housing Stability: Not on file     Access to primary and/or specialist care, but he states he does not regularly follow-up with anybody. Reports generally stable financial, home and living environment. Review of Systems     Review of Systems   Constitutional:  Negative for chills and fever. HENT:  Negative for congestion and rhinorrhea. Eyes:  Negative for visual disturbance. Respiratory:  Negative for cough and shortness of breath. Cardiovascular:  Positive for palpitations and leg swelling. Negative for chest pain. Gastrointestinal:  Negative for abdominal pain, diarrhea, nausea and vomiting. Genitourinary:  Negative for urgency. Musculoskeletal:  Negative for myalgias. Skin:  Negative for rash. Neurological:  Negative for headaches. Physical Exam     Physical Exam  Constitutional:       General: He is not in acute distress. Appearance: Normal appearance. He is obese. He is not toxic-appearing. Comments: Alert, nontoxic very conversant   HENT:      Head: Normocephalic and atraumatic. Right Ear: External ear normal.      Left Ear: External ear normal.      Nose: Nose normal.      Mouth/Throat:      Mouth: Mucous membranes are moist.      Pharynx: No oropharyngeal exudate or posterior oropharyngeal erythema. Eyes:      Conjunctiva/sclera: Conjunctivae normal.      Pupils: Pupils are equal, round, and reactive to light. Cardiovascular:      Rate and Rhythm: Tachycardia present. Rhythm irregular. Pulses: Normal pulses. Radial pulses are 2+ on the right side and 2+ on the left side. Heart sounds: Normal heart sounds. No murmur heard. No friction rub. Pulmonary:      Effort: Pulmonary effort is normal.      Breath sounds: Normal breath sounds. No wheezing, rhonchi or rales. Chest:      Chest wall: No edema. Abdominal:      General: Abdomen is flat. Palpations: Abdomen is soft. Tenderness: There is no abdominal tenderness. There is no guarding or rebound.    Musculoskeletal:         General: No swelling or tenderness. Normal range of motion. Cervical back: Normal range of motion and neck supple. Right lower leg: No edema. Left lower leg: No edema. Skin:     General: Skin is warm and dry. Capillary Refill: Capillary refill takes less than 2 seconds. Findings: No rash. Neurological:      General: No focal deficit present. Mental Status: He is alert. Motor: No weakness. Lab and Diagnostic Study Results     Labs -  Recent Results (from the past 24 hour(s))   CBC WITH AUTOMATED DIFF    Collection Time: 01/29/23 12:56 PM   Result Value Ref Range    WBC 9.7 4.1 - 11.1 K/uL    RBC 5.42 4.10 - 5.70 M/uL    HGB 14.8 12.1 - 17.0 g/dL    HCT 45.2 36.6 - 50.3 %    MCV 83.4 80.0 - 99.0 FL    MCH 27.3 26.0 - 34.0 PG    MCHC 32.7 30.0 - 36.5 g/dL    RDW 15.9 (H) 11.5 - 14.5 %    PLATELET 725 (H) 596 - 400 K/uL    MPV 10.9 8.9 - 12.9 FL    NRBC 0.4 (H) 0.0  WBC    ABSOLUTE NRBC 0.04 (H) 0.00 - 0.01 K/uL    NEUTROPHILS 70 32 - 75 %    LYMPHOCYTES 18 12 - 49 %    MONOCYTES 7 5 - 13 %    EOSINOPHILS 3 0 - 7 %    BASOPHILS 1 0 - 1 %    IMMATURE GRANULOCYTES 1 (H) 0 - 0.5 %    ABS. NEUTROPHILS 6.8 1.8 - 8.0 K/UL    ABS. LYMPHOCYTES 1.8 0.8 - 3.5 K/UL    ABS. MONOCYTES 0.7 0.0 - 1.0 K/UL    ABS. EOSINOPHILS 0.3 0.0 - 0.4 K/UL    ABS. BASOPHILS 0.1 0.0 - 0.1 K/UL    ABS. IMM. GRANS. 0.1 (H) 0.00 - 0.04 K/UL    DF AUTOMATED     METABOLIC PANEL, COMPREHENSIVE    Collection Time: 01/29/23 12:56 PM   Result Value Ref Range    Sodium 139 136 - 145 mmol/L    Potassium 4.0 3.5 - 5.1 mmol/L    Chloride 110 (H) 97 - 108 mmol/L    CO2 24 21 - 32 mmol/L    Anion gap 5 5 - 15 mmol/L    Glucose 131 (H) 65 - 100 mg/dL    BUN 13 6 - 20 mg/dL    Creatinine 1.06 0.70 - 1.30 mg/dL    BUN/Creatinine ratio 12 12 - 20      eGFR >60 >60 ml/min/1.73m2    Calcium 8.9 8.5 - 10.1 mg/dL    Bilirubin, total 0.3 0.2 - 1.0 mg/dL    AST (SGOT) 26 15 - 37 U/L    ALT (SGPT) 53 12 - 78 U/L    Alk.  phosphatase 63 45 - 117 U/L    Protein, total 7.1 6.4 - 8.2 g/dL    Albumin 3.9 3.5 - 5.0 g/dL    Globulin 3.2 2.0 - 4.0 g/dL    A-G Ratio 1.2 1.1 - 2.2     TROPONIN-HIGH SENSITIVITY    Collection Time: 01/29/23 12:56 PM   Result Value Ref Range    Troponin-High Sensitivity 9 0 - 76 ng/L   NT-PRO BNP    Collection Time: 01/29/23 12:56 PM   Result Value Ref Range    NT pro- (H) <125 pg/mL   MAGNESIUM    Collection Time: 01/29/23 12:56 PM   Result Value Ref Range    Magnesium 2.3 1.6 - 2.4 mg/dL   TROPONIN-HIGH SENSITIVITY    Collection Time: 01/29/23  3:50 PM   Result Value Ref Range    Troponin-High Sensitivity 10 0 - 76 ng/L         Radiologic Studies -   XR CHEST PORT   Final Result   No evidence of acute cardiopulmonary process. Please see the full medical record for comprehensive list of labs and imaging obtained during the visit. All labs and imaging studies were personally reviewed. My intepretation(s) if applicable are below:     EKG interpretation(s): See ED course for my interpretation of EKG(s) and/or my interpretation of Xrays. Xray Interpretations(s): See ED course for my interpretation of EKG(s) and/or my interpretation of Xrays. Cardiac Monitor:    Rate: 88 bpm    Rhythm: A-Fib    Pulse Oximetry Analysis - 100% on RA    Medical Decision Making and ED Course   - I am the first and primary provider for this patient AND AM THE PRIMARY PROVIDER OF RECORD. - I reviewed the vital signs, available nursing notes, past medical history, past surgical history, family history and social history. - Initial assessment performed. The patients presenting problems have been discussed, and the staff are in agreement with the care plan formulated and outlined with them. I have encouraged them to ask questions as they arise throughout their visit. Vital Signs-Reviewed the patient's vital signs. No data found.         Records Reviewed: Prior medical records and Nursing notes    Additional Considerations:    Admission considered, I engaged the patient in shared decision making and they prefer to follow up with their PCP/Specialist instead. Provider Notes (Medical Decision Making):     MDM  Number of Diagnoses or Management Options  Atrial fibrillation, unspecified type [facility])  Diagnosis management comments: Patient presents with complaints of palpitations, intermittent shortness of breath, left shoulder pain which he thinks is just due to \"anxiety\". He has a history of A. fib, has not been in A. fib in several years, very much paroxysmal A. fib. He identifies when he goes into A. fib, and states that he always knows. He is asking to be cardioverted. Discussed with the patient we will get some labs first, we would try some medications to see if we can rate control and then get him out of A. fib. We could consider nonemergent elective cardioversion in the ED if he is agreeable and I discussed the potential risks of that which would include include possible stroke, which for him the risk would be lower because he indicates that he just went into A. fib less than 48 hours ago and he took Xarelto this morning. There is also potential for decompensation, arrhythmia, cardiac failure which I discussed with him as well. We will attempt medications first, minimally to try to rate control him and I will speak with cardiology if were not successful. DDX for palpitations would include premature ventricular contractions, premature atrial contractions, premature junctional contractions, electrolyte derangement, hypomagnesemia, hypokalemia, long QT syndrome, torsade de pointes, SVT, A. fib, ACS, etc.    Results reviewed and patient reassessed. See ED course section. Indication rate controlled after single dose of Cardizem and some IV fluids. Remains in A. fib. Delta troponin negative. Patient remains pain-free.   I paged cardiology to consult regarding disposition for the patient it took some time to receive a call back and patient declined to stay any further and wanted to be discharged. I do not think this is unreasonable, he is rate controlled at this time he took his Xarelto this morning and he needs a refill of that. He is not on any rate control agents at home so I will start him on a low-dose of Cardizem to be taken daily to keep him rate controlled. He has a cardiologist but have not seen him in a while and he will call for follow-up. Patient is eager to go home, but agreed to stay for administration of dose of oral Cardizem here because his pharmacy is closed. Patient encouraged to return at any time if he experiences worsening in his condition or any new symptoms arise. ED Course:         ED Course as of 01/30/23 1206   Sun Jan 29, 2023   1252 EKG interpretation atrial fibrillation rate of 129 with RVR. No ST elevation or depression. There are no T wave inversions. Overall A. fib with RVR no acute ischemic changes [MANDO]   1403 Monitor interpretation rate is 65 irregular likely rate controlled A. fib. [MANDO]   1738 Patient in what appears to be a rate controlled A. fib in the 70s. We will recheck his troponin to ensure its not increasing. If it is stable I will contact cardiology to discuss elective cardioversion today in the ED. [MANDO]   1505 Pending troponin and EKG. [MANDO]   5483 Subsequent EKG interpretation atrial fibrillation rate of 79, rate controlled no ST elevation or depression. Overall rate controlled A. fib [MANDO]   9848 Cardiology paged, waiting a callback. [MANDO]   1650 Patient reassessed, he is resting comfortably has no symptoms at the moment he states that he thinks the left shoulder pain was likely from his shoulder arthritis for which he has a chronic history. He is anxious to be discharged. Should be taking a continuous anticoagulant, rather than intermittent therapy. Would start him on rate control medication. . [MANDO]   3841 Cardiology was repaged.  [MANDO]   3286 Patient reassessed, he states he is tired of waiting for cardiology call back. I will discharge him, restart his Xarelto and put him on rate control diltiazem. He will call his cardiologist tomorrow. [MANDO]      ED Course User Index  [MANDO] Timothy Potts DO     This appears to be an acute condition.  ------------------------------------------------------------------------------------------------------------        Consultations:       Consultations: - NONE    See the ED course section, if applicable for details on the content of consultations requested. Procedures and Critical Care       Performed by: Marcos Houston DO    Procedures             CRITICAL CARE NOTE :  12:50 PM  CRITICAL CARE TIME: I have spent 39 minutes of critical care time involved in lab review, consultations with specialist, family decision-making, and documentation. During this entire length of time I was immediately available to the patient. Critical Care: The reason for providing this level of medical care for this critically ill patient was due a critical illness that impaired one or more vital organ systems such that there was a high probability of imminent or life threatening deterioration in the patients condition. This care involved high complexity decision making to assess, manipulate, and support vital system functions, to treat this vital organ system failure and to prevent further life threatening deterioration of the patients condition. Aggregate critical care time is exclusive of any separately billable procedures and teaching time. DO Marcos Ferrer DO        Disposition         Diagnosis:   1.  Atrial fibrillation, unspecified type [facility])          Disposition: Discharged Home    Follow-up Information       Follow up With Specialties Details Why Contact Info    Annabelle Harris MD [facility] Call today  [address] [place] [place] [address]      Nava Hyatt, MD Cardio Vascular Surgery, Cardiovascular Disease Physician Call in 1 day  [address] [address]  [address] [facility]  Call in 1 day  [address], [address], [address] [facility] Vascular Surgery, Cardiovascular Disease Physician Call in 1 day  [place] [address]      Francisco Alexandre MD Cardiovascular Disease Physician In 1 day  [address] [id number] [phone]      [facility] EMERGENCY DEPT Emergency Medicine  As needed, If symptoms worsen; or [facility] Emergency Department [address] [address]  [facility]  In 1 day  [address]  [phone]            Discharge Medication List as of 1/29/2023  6:37 PM        START taking these medications    Details   rivaroxaban (XARELTO) 20 mg tab tablet Take 1 Tablet by mouth daily for 21 days. , Normal, Disp-21 Tablet, R-0      dilTIAZem ER (Cardizem CD) 120 mg capsule Take 1 Capsule by mouth daily for 21 days. , Normal, Disp-21 Capsule, R-0           CONTINUE these medications which have NOT CHANGED    Details   predniSONE (STERAPRED) 5 mg dose pack Take as directed, Normal, Disp-1 Dose Pack, R-0               Diagnosis     Clinical Impression:   1. Atrial fibrillation, unspecified type [facility])        Attestations:    Valentina Cortes, DO    Please note that this dictation was completed with Achie[employer] X, the computer voice recognition software. Quite often unanticipated grammatical, syntax, homophones, and other interpretive errors are inadvertently transcribed by the computer software. Please disregard these errors. Please excuse any errors that have escaped final proofreading. Thank you.

## 2023-01-29 NOTE — DISCHARGE INSTRUCTIONS
Thank you for allowing us to provide you with excellent care today. We hope we addressed all of your concerns and needs. We strive to provide excellent quality care in the Emergency Department. Anything less than excellent does not meet our expectations for you. Incidental findings are findings on labs or imaging studies that do not necessarily correlate with the reason for your visit. We make every effort to inform you of these incidental findings and the proper follow-up, however it is important that you call your primary care doctor or a primary care doctor in 1 to 2 days to set up a follow-up visit so they can go through your results in detail with you, and determine if additional testing is needed. The emergency room is not intended to be complete or comprehensive care, and it serves as a means to rule out life-threatening pathologies. It is very important that you follow-up with your primary care doctor to arrange additional testing and/or treatment if needed. The exam and treatment you received in the Emergency Department were for an urgent problem and are not intended as complete care. It is important that you follow-up with a doctor, nurse practitioner, or physician assistant to:  (1) confirm your diagnosis,  (2) re-evaluation of changes in your illness and treatment, and  (3) for ongoing care. If your symptoms become worse or you do not improve as expected, or you develop any new symptoms that concern you and/or you are unable to reach your usual health care provider, you should return to the Emergency Department. We are available 24 hours a day. If your blood pressure is greater than 120/80, your blood pressure is considered elevated above normal and you need to follow up with your primary care physician or the physician provided on your discharge instructions for a blood pressure recheck.      If you were prescribed narcotic medications such as hydrocodone, oxycodone, diazepam, lorazepam, alprazolam, tramadol or codeine, these medications will NOT typically be refilled in the Emergency Room. Follow up with your primary care doctor or specialist to discuss this, or you may return to the Emergency room if your condition worsens. XR CHEST PORT   Final Result   No evidence of acute cardiopulmonary process. Diagnosis:   1. Atrial fibrillation, unspecified type ([facility] [address]          Take this sheet with you when you go to your follow-up visit. If you have any problem arranging the follow-up visit, contact [phone] ([phone])    Make an appointment with your Primary Care doctor for follow up of this visit. Return to the ER if you are unable to be seen in the time recommended on your discharge instructions. It has been a pleasure caring for you today. Return to the ER or seek medical care for any worsening in your condition at any time. Lanyrd Activation    Thank you for requesting access to [employer]. Please follow the instructions below to securely access and download your online medical record. Lanyrd allows you to send messages to your doctor, view your test results, renew your prescriptions, schedule appointments, and more. How Do I Sign Up? In your internet browser, go to [email or web address]  Click on the First Time User? Click Here link in the Sign In box. You will be redirect to the New Member Sign Up page. Enter your Lanyrd Access Code exactly as it appears below. You will not need to use this code after youve completed the sign-up process. If you do not sign up before the expiration date, you must request a new code. Lanyrd Access Code: Activation code not generated  Current Lanyrd Status: Active (This is the date your Lanyrd access code will )    Enter the last four digits of your Social Security Number (xxxx) and Date of Birth (mm/dd/yyyy) as indicated and click Submit. You will be taken to the next sign-up page. Create a Lanyrd ID.  This will be your [employer] login ID and cannot be changed, so think of one that is secure and easy to remember. Create a [employer] password. You can change your password at any time. Enter your Password Reset Question and Answer. This can be used at a later time if you forget your password. Enter your e-mail address. You will receive e-mail notification when new information is available in [address] Click Sign Up. You can now view and download portions of your medical record. Click the [employer] link to download a portable copy of your medical information. Additional Information    If you have questions, please visit the Frequently Asked Questions section of the [employer] website at [email or web address] Remember, [employer] is NOT to be used for urgent needs. For medical emergencies, dial 911.

## 2023-01-29 NOTE — Clinical Note
[facility] EMERGENCY DEPT  [address] [place] [address]  [phone]    Work/School Note    Date: 1/29/2023    To Whom It May concern:    Vikash Fabian was seen and treated today in the emergency room by the following provider(s):  Attending Provider: Cassandra Cee DO. Vikash Fabian is excused from work/school on 01/29/23 and 01/30/23. He is medically clear to return to work/school on 1/31/2023.        Sincerely,          Tristen Colon DO

## 2023-01-30 LAB
ATRIAL RATE: 340 BPM
ATRIAL RATE: 500 BPM
CALCULATED R AXIS, ECG10: -10 DEGREES
CALCULATED R AXIS, ECG10: 0 DEGREES
CALCULATED T AXIS, ECG11: 24 DEGREES
CALCULATED T AXIS, ECG11: 67 DEGREES
DIAGNOSIS, 93000: NORMAL
DIAGNOSIS, 93000: NORMAL
Q-T INTERVAL, ECG07: 270 MS
Q-T INTERVAL, ECG07: 356 MS
QRS DURATION, ECG06: 88 MS
QRS DURATION, ECG06: 90 MS
QTC CALCULATION (BEZET), ECG08: 395 MS
QTC CALCULATION (BEZET), ECG08: 408 MS
VENTRICULAR RATE, ECG03: 129 BPM
VENTRICULAR RATE, ECG03: 79 BPM